# Patient Record
(demographics unavailable — no encounter records)

---

## 2025-01-14 NOTE — HISTORY OF PRESENT ILLNESS
[FreeTextEntry1] : 49 year old female with PMHx of obesity presents for a cardiac evaluation. Patient had APCs on her preoperative ECG prior to her knee surgery. She states she went for knee surgery and had it, but she was alerted to the fact that she was having "extra beats" on her telemetry. She has no chest pain, dyspnea. She gets palpitations that is described as a buzzing sensation in her chest.  Mother and Brother with LQTS, however, she states she was genetically tested in the past and she was negative.  There is no history of MI, CVA, CHF, or previous coronary intervention.

## 2025-01-14 NOTE — DISCUSSION/SUMMARY
[FreeTextEntry1] : 1. APCs/Intermittent Palpitations: recommend echocardiogram and 3 Day Zio to assess APC burden. Patient states she has been told that she snores heavily. Advising home sleep study as well to screen for AIREL. Reviewed labs from 12/12/2024, and electrolytes within normal limits.  Follow up after testing.

## 2025-01-14 NOTE — PHYSICAL EXAM
[Normal] : moves all extremities, no focal deficits, normal speech [de-identified] :  No carotid bruits auscultated bilaterally. [de-identified] : ambulates with cane

## 2025-01-28 NOTE — DISCUSSION/SUMMARY
[FreeTextEntry1] : 1. 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 Pending sleep specialist consultation. Pending results of 3 Day Zio. If frequent APCs on monitor, I would advise Toprol X 25mg daily. Reviewed labs from 12/12/2024, and electrolytes within normal limits.  2. MVP: with mild-moderate MR on echocardiogram from 1/27/2025. No SBE prophylaxis required. Recommend periodic echo surveillance.   Follow up in 1 year.

## 2025-01-28 NOTE — PHYSICAL EXAM
Patient noted to have TSH less than 0.010 and a free T4 of 1.33  Upon further discussion with family/daughter and wife at bedside.  They report significant family history of hypothyroidism.  1 daughter having had recent removal of thyroid for unclear etiology.  Recently it appears that PCP may have encouraged outpatient workup but inability per wife to get appointment for at least 1 year  Patient endorses weight loss but is also not been eating due to primary diagnosis  Ongoing workup  Consult endocrinology    [Normal] : moves all extremities, no focal deficits, normal speech [de-identified] :  No carotid bruits auscultated bilaterally. [de-identified] : ambulates with cane

## 2025-01-28 NOTE — CARDIOLOGY SUMMARY
[de-identified] : 12/12/2024, NSR with PACs [de-identified] : 1/27/2025, LV EF 60%, normal LV diastolic function, MVP (both leaflets) with mild-moderate MR, LA is mildly dilated.

## 2025-05-20 NOTE — CARDIOLOGY SUMMARY
[de-identified] : 5/20/2025, NSR with PVC, non-specific T wave changes 12/12/2024, NSR with PACs [de-identified] : 11/2024, 3 Day Zio: NSR with frequent PACs (18.6% burden). [de-identified] : 1/27/2025, LV EF 60%, normal LV diastolic function, MVP (both leaflets) with mild-moderate MR, LA is mildly dilated.

## 2025-05-20 NOTE — HISTORY OF PRESENT ILLNESS
[FreeTextEntry1] : Historical Perspective: 49 year old female with PMHx of obesity presents for a cardiac evaluation. Patient had APCs on her preoperative ECG prior to her knee surgery. She states she went for knee surgery and had it, but she was alerted to the fact that she was having "extra beats" on her telemetry. She has no chest pain, dyspnea. She gets palpitations that is described as a buzzing sensation in her chest.  Mother and Brother with LQTS, however, she states she was genetically tested in the past and she was negative.  There is no history of MI, CVA, CHF, or previous coronary intervention.  Current Health Status: Patient with no chest pain, SOB. Improved palpitations. No hospitalizations since seeing me last. Remains compliant with medications and reports no adverse effects. Saw sleep specialist and pending CPAP.

## 2025-05-20 NOTE — DISCUSSION/SUMMARY
[FreeTextEntry1] : 1. 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 Pending CPAP. Patient underwent 3 Day Zio, 11/2024, and it demonstrated frequent PACs (18.6%) burden. Now on Toprol XL 25mg daily and patient noted to have improvement in her symptoms. Advising pharmacologic nuclear stress testing on medications. Please note patient unable to ambulate on treadmill because of knee injury.  Reviewed labs from 12/12/2024, and electrolytes within normal limits.  2. MVP: with mild-moderate MR on echocardiogram from 1/27/2025. No SBE prophylaxis required. Recommend periodic echo surveillance.   Follow up with me in 6 months. [EKG obtained to assist in diagnosis and management of assessed problem(s)] : EKG obtained to assist in diagnosis and management of assessed problem(s)

## 2025-05-20 NOTE — PHYSICAL EXAM
[Normal] : moves all extremities, no focal deficits, normal speech [de-identified] :  No carotid bruits auscultated bilaterally. [de-identified] : ambulates with cane